# Patient Record
Sex: MALE | URBAN - METROPOLITAN AREA
[De-identification: names, ages, dates, MRNs, and addresses within clinical notes are randomized per-mention and may not be internally consistent; named-entity substitution may affect disease eponyms.]

---

## 2021-02-04 ENCOUNTER — APPOINTMENT (RX ONLY)
Dept: URBAN - METROPOLITAN AREA CLINIC 51 | Facility: CLINIC | Age: 61
Setting detail: DERMATOLOGY
End: 2021-02-04

## 2021-02-04 DIAGNOSIS — L20.89 OTHER ATOPIC DERMATITIS: ICD-10-CM | Status: INADEQUATELY CONTROLLED

## 2021-02-04 DIAGNOSIS — L82.1 OTHER SEBORRHEIC KERATOSIS: ICD-10-CM | Status: STABLE

## 2021-02-04 DIAGNOSIS — L57.3 POIKILODERMA OF CIVATTE: ICD-10-CM | Status: STABLE

## 2021-02-04 PROBLEM — L20.84 INTRINSIC (ALLERGIC) ECZEMA: Status: ACTIVE | Noted: 2021-02-04

## 2021-02-04 PROBLEM — D48.5 NEOPLASM OF UNCERTAIN BEHAVIOR OF SKIN: Status: ACTIVE | Noted: 2021-02-04

## 2021-02-04 PROBLEM — C44.319 BASAL CELL CARCINOMA OF SKIN OF OTHER PARTS OF FACE: Status: ACTIVE | Noted: 2021-02-04

## 2021-02-04 PROCEDURE — 99204 OFFICE O/P NEW MOD 45 MIN: CPT | Mod: 25

## 2021-02-04 PROCEDURE — ? BIOPSY BY SHAVE METHOD

## 2021-02-04 PROCEDURE — ? TREATMENT REGIMEN

## 2021-02-04 PROCEDURE — 17281 DSTR MAL LS F/E/E/N/L/M .6-1: CPT

## 2021-02-04 PROCEDURE — ? COUNSELING

## 2021-02-04 PROCEDURE — ? SHAVE REMOVAL AND DESTRUCTION

## 2021-02-04 PROCEDURE — 11102 TANGNTL BX SKIN SINGLE LES: CPT | Mod: 59

## 2021-02-04 PROCEDURE — ? PRESCRIPTION

## 2021-02-04 RX ORDER — CLOBETASOL PROPIONATE 0.5 MG/G
OINTMENT TOPICAL BID
Qty: 60 | Refills: 1 | Status: ERX

## 2021-02-04 ASSESSMENT — LOCATION DETAILED DESCRIPTION DERM
LOCATION DETAILED: LEFT DISTAL VENTRAL THUMB
LOCATION DETAILED: LEFT DISTAL DORSAL FOREARM
LOCATION DETAILED: LEFT INFERIOR FOREHEAD
LOCATION DETAILED: LEFT SUPERIOR ANTERIOR NECK
LOCATION DETAILED: RIGHT PROXIMAL DORSAL FOREARM
LOCATION DETAILED: RIGHT DISTAL VENTRAL THUMB

## 2021-02-04 ASSESSMENT — LOCATION ZONE DERM
LOCATION ZONE: FACE
LOCATION ZONE: ARM
LOCATION ZONE: NECK
LOCATION ZONE: FINGER

## 2021-02-04 ASSESSMENT — SEVERITY ASSESSMENT 2020: SEVERITY 2020: MILD

## 2021-02-04 ASSESSMENT — LOCATION SIMPLE DESCRIPTION DERM
LOCATION SIMPLE: LEFT ANTERIOR NECK
LOCATION SIMPLE: LEFT FOREHEAD
LOCATION SIMPLE: RIGHT FOREARM
LOCATION SIMPLE: LEFT THUMB
LOCATION SIMPLE: LEFT FOREARM
LOCATION SIMPLE: RIGHT THUMB

## 2021-02-04 ASSESSMENT — BSA RASH: BSA RASH: 2

## 2021-02-04 ASSESSMENT — PAIN INTENSITY VAS: HOW INTENSE IS YOUR PAIN 0 BEING NO PAIN, 10 BEING THE MOST SEVERE PAIN POSSIBLE?: NO PAIN

## 2021-02-04 NOTE — PROCEDURE: SHAVE REMOVAL AND DESTRUCTION
Detail Level: Detailed
Size Of Lesion In Cm: 0.5
Size After Destruction (Required For Destruction Billing): 0.7
Anesthesia Type: 1% lidocaine with epinephrine
Anesthesia Volume In Cc: 1
Anesthesia Volume In Cc: 0
Hemostasis: Electrocautery
Cautery Type: electrodesiccation
Was Curettage Performed?: Yes
Number Of Curettages: 3
Wound Care: Aquaphor
Dressing: Band-Aid
Bill As?: Note: Bill Malignant Destruction If Path Confirms Malignant Lesion. Only Bill As Shave Removal If Path Comes Back Benign. Do Not Bill Shave Removal On Malignant Lesions.: Malignant Destruction
Lab: [id number]
Lab Facility: [id number]
Render Path Notes In Note?: No
Consent: Verbal consent was obtained and risks were reviewed including but not limited to scarring, infection, bleeding, scabbing, incomplete removal, nerve damage and allergy to anesthesia.
Post-Care Instructions: I reviewed with the patient in detail post-care instructions. Patient is to keep the biopsy site dry overnight, and then apply bacitracin twice daily until healed. Patient may apply hydrogen peroxide soaks to remove any crusting.
Notification Instructions: Patient will be notified of biopsy results. However, patient instructed to call the office if not contacted within 2 weeks.
Billing Type: Third-Party Bill

## 2021-02-04 NOTE — HPI: RASH
How Severe Is Your Rash?: mild
Is This A New Presentation, Or A Follow-Up?: Rash
Additional History: Works at a [employer]

## 2021-02-04 NOTE — PROCEDURE: BIOPSY BY SHAVE METHOD
Detail Level: Detailed
Depth Of Biopsy: dermis
Was A Bandage Applied: Yes
Size Of Lesion In Cm: 1
X Size Of Lesion In Cm: 0
Anticipated Plan (Based On Presumed Biopsy Results): Mohs vs. DMS with DST
Biopsy Type: H and E
Biopsy Method: Sheba hay
Anesthesia Type: 1% lidocaine without epinephrine
Hemostasis: Electrocautery
Wound Care: Aquaphor
Dressing: Band-Aid
Destruction After The Procedure: No
Type Of Destruction Used: Curettage
Lab: [id number]
Lab Facility: [id number]
Consent: Verbal consent was obtained and risks were reviewed including but not limited to scarring, infection, bleeding, scabbing, incomplete removal, nerve damage and allergy to anesthesia.
Post-Care Instructions: I reviewed with the patient in detail post-care instructions. Patient is to keep the biopsy site dry overnight, and then apply bacitracin twice daily until healed. Patient may apply hydrogen peroxide soaks to remove any crusting.
Notification Instructions: Patient will be notified of biopsy results. However, patient instructed to call the office if not contacted within 2 weeks.
Billing Type: Third-Party Bill
Information: Selecting Yes will display possible errors in your note based on the variables you have selected. This validation is only offered as a suggestion for you. PLEASE NOTE THAT THE VALIDATION TEXT WILL BE REMOVED WHEN YOU FINALIZE YOUR NOTE. IF YOU WANT TO FAX A PRELIMINARY NOTE YOU WILL NEED TO TOGGLE THIS TO 'NO' IF YOU DO NOT WANT IT IN YOUR FAXED NOTE.

## 2021-02-04 NOTE — PROCEDURE: TREATMENT REGIMEN
Detail Level: Zone
Initiate Treatment: Clobetasol ointment apply to thumbs twice daily for 2 weeks, then may use twice weekly for maintenance. Recommend covering with bandaids for 1 week

## 2021-02-04 NOTE — HPI: SKIN LESION
Is This A New Presentation, Or A Follow-Up?: Skin Lesion
How Severe Is Your Skin Lesion?: mild
Has Your Skin Lesion Been Treated?: not been treated
Additional History: Patient was told it was a deformed tear duct. Place scales and he picks it off, but then it comes back

## 2021-04-14 ENCOUNTER — APPOINTMENT (RX ONLY)
Dept: URBAN - METROPOLITAN AREA CLINIC 51 | Facility: CLINIC | Age: 61
Setting detail: DERMATOLOGY
End: 2021-04-14

## 2021-04-14 DIAGNOSIS — L57.0 ACTINIC KERATOSIS: ICD-10-CM | Status: INADEQUATELY CONTROLLED

## 2021-04-14 PROCEDURE — 17000 DESTRUCT PREMALG LESION: CPT

## 2021-04-14 PROCEDURE — ? COUNSELING

## 2021-04-14 PROCEDURE — ? LIQUID NITROGEN

## 2021-04-14 PROCEDURE — 17003 DESTRUCT PREMALG LES 2-14: CPT

## 2021-04-14 ASSESSMENT — PAIN INTENSITY VAS: HOW INTENSE IS YOUR PAIN 0 BEING NO PAIN, 10 BEING THE MOST SEVERE PAIN POSSIBLE?: NO PAIN

## 2021-04-14 ASSESSMENT — LOCATION SIMPLE DESCRIPTION DERM
LOCATION SIMPLE: LEFT EAR
LOCATION SIMPLE: LEFT ZYGOMA

## 2021-04-14 ASSESSMENT — LOCATION ZONE DERM
LOCATION ZONE: FACE
LOCATION ZONE: EAR

## 2021-04-14 ASSESSMENT — LOCATION DETAILED DESCRIPTION DERM
LOCATION DETAILED: LEFT SUPERIOR CRUS OF ANTIHELIX
LOCATION DETAILED: LEFT LATERAL ZYGOMA

## 2021-04-14 ASSESSMENT — TOTAL NUMBER OF LESIONS: # OF LESIONS?: 2

## 2021-04-14 NOTE — PROCEDURE: LIQUID NITROGEN
Detail Level: Detailed
Post-Care Instructions: I reviewed with the patient in detail post-care instructions. Patient is to wear sunprotection, and avoid picking at any of the treated lesions. Pt may apply Vaseline to crusted or scabbing areas.
Render Note In Bullet Format When Appropriate: No
Consent: The patient's consent was obtained including but not limited to risks of crusting, scabbing, blistering, scarring, darker or lighter pigmentary change, recurrence, incomplete removal and infection.
Render Post-Care Instructions In Note?: yes
Number Of Freeze-Thaw Cycles: 2 freeze-thaw cycles
Duration Of Freeze Thaw-Cycle (Seconds): 2

## 2021-08-09 ENCOUNTER — APPOINTMENT (RX ONLY)
Dept: URBAN - METROPOLITAN AREA CLINIC 51 | Facility: CLINIC | Age: 61
Setting detail: DERMATOLOGY
End: 2021-08-09

## 2021-08-09 DIAGNOSIS — L82.1 OTHER SEBORRHEIC KERATOSIS: ICD-10-CM | Status: STABLE

## 2021-08-09 DIAGNOSIS — Z85.828 PERSONAL HISTORY OF OTHER MALIGNANT NEOPLASM OF SKIN: ICD-10-CM | Status: RESOLVED

## 2021-08-09 PROCEDURE — 99212 OFFICE O/P EST SF 10 MIN: CPT

## 2021-08-09 PROCEDURE — ? COUNSELING

## 2021-08-09 PROCEDURE — ? SUNSCREEN RECOMMENDATIONS

## 2021-08-09 ASSESSMENT — LOCATION DETAILED DESCRIPTION DERM
LOCATION DETAILED: RIGHT DISTAL RADIAL DORSAL FOREARM
LOCATION DETAILED: RIGHT LATERAL MALAR CHEEK
LOCATION DETAILED: LEFT DISTAL DORSAL FOREARM
LOCATION DETAILED: RIGHT SUPERIOR UPPER BACK

## 2021-08-09 ASSESSMENT — LOCATION SIMPLE DESCRIPTION DERM
LOCATION SIMPLE: RIGHT FOREARM
LOCATION SIMPLE: RIGHT CHEEK
LOCATION SIMPLE: LEFT FOREARM
LOCATION SIMPLE: RIGHT UPPER BACK

## 2021-08-09 ASSESSMENT — LOCATION ZONE DERM
LOCATION ZONE: TRUNK
LOCATION ZONE: ARM
LOCATION ZONE: FACE

## 2021-08-09 ASSESSMENT — PAIN INTENSITY VAS: HOW INTENSE IS YOUR PAIN 0 BEING NO PAIN, 10 BEING THE MOST SEVERE PAIN POSSIBLE?: NO PAIN

## 2021-08-09 NOTE — HPI: HISTORY OF BASAL CELL CARCINOMA
What Is The Reason For Today's Visit?: History of Basal Cell Carcinoma
How Many Bccs Have You Had?: one
When Was Your Last Cancer Diagnosed?: 02/21

## 2021-08-17 ENCOUNTER — HISTORICAL (OUTPATIENT)
Dept: RADIOLOGY | Facility: HOSPITAL | Age: 61
End: 2021-08-17

## 2022-04-10 ENCOUNTER — HISTORICAL (OUTPATIENT)
Dept: ADMINISTRATIVE | Facility: HOSPITAL | Age: 62
End: 2022-04-10

## 2022-04-25 VITALS
OXYGEN SATURATION: 98 % | SYSTOLIC BLOOD PRESSURE: 117 MMHG | DIASTOLIC BLOOD PRESSURE: 69 MMHG | BODY MASS INDEX: 30.29 KG/M2 | HEIGHT: 60 IN | WEIGHT: 154.31 LBS

## 2022-07-26 RX ORDER — LISINOPRIL 20 MG/1
20 TABLET ORAL 2 TIMES DAILY
COMMUNITY
Start: 2021-08-10 | End: 2022-09-19 | Stop reason: SDUPTHER

## 2022-07-26 RX ORDER — CLOPIDOGREL BISULFATE 75 MG/1
75 TABLET ORAL DAILY
COMMUNITY
Start: 2021-08-10 | End: 2022-09-19 | Stop reason: SDUPTHER

## 2022-07-26 RX ORDER — NAPROXEN SODIUM 220 MG/1
81 TABLET, FILM COATED ORAL DAILY
COMMUNITY
Start: 2021-08-18 | End: 2022-09-19 | Stop reason: SDUPTHER

## 2022-07-26 RX ORDER — AMLODIPINE BESYLATE 10 MG/1
10 TABLET ORAL DAILY
COMMUNITY
Start: 2021-08-10 | End: 2022-09-19 | Stop reason: SDUPTHER

## 2022-07-26 RX ORDER — ATORVASTATIN CALCIUM 80 MG/1
80 TABLET, FILM COATED ORAL DAILY
COMMUNITY
Start: 2021-08-18 | End: 2022-09-19 | Stop reason: SDUPTHER

## 2022-07-27 ENCOUNTER — TELEPHONE (OUTPATIENT)
Dept: CARDIOLOGY | Facility: CLINIC | Age: 62
End: 2022-07-27
Payer: MEDICAID

## 2022-07-27 DIAGNOSIS — I42.0 CONGESTIVE CARDIOMYOPATHY: Primary | ICD-10-CM

## 2022-07-27 NOTE — TELEPHONE ENCOUNTER
----- Message from Twan Castle MD sent at 7/26/2022  6:02 PM CDT -----  This patient has requested refills on medication(s).  The patient being on medications that affect:  Kidney function    The patient needs the following labs checked:  BMP

## 2022-08-31 ENCOUNTER — APPOINTMENT (RX ONLY)
Dept: URBAN - METROPOLITAN AREA CLINIC 51 | Facility: CLINIC | Age: 62
Setting detail: DERMATOLOGY
End: 2022-08-31

## 2022-08-31 DIAGNOSIS — Z85.828 PERSONAL HISTORY OF OTHER MALIGNANT NEOPLASM OF SKIN: ICD-10-CM | Status: RESOLVED

## 2022-08-31 DIAGNOSIS — L82.1 OTHER SEBORRHEIC KERATOSIS: ICD-10-CM | Status: STABLE

## 2022-08-31 DIAGNOSIS — L81.4 OTHER MELANIN HYPERPIGMENTATION: ICD-10-CM | Status: STABLE

## 2022-08-31 DIAGNOSIS — L57.0 ACTINIC KERATOSIS: ICD-10-CM

## 2022-08-31 PROCEDURE — 17000 DESTRUCT PREMALG LESION: CPT | Mod: 59

## 2022-08-31 PROCEDURE — 17003 DESTRUCT PREMALG LES 2-14: CPT | Mod: 59

## 2022-08-31 PROCEDURE — 99212 OFFICE O/P EST SF 10 MIN: CPT | Mod: 25

## 2022-08-31 PROCEDURE — ? COUNSELING

## 2022-08-31 PROCEDURE — ? LIQUID NITROGEN

## 2022-08-31 PROCEDURE — ? SUNSCREEN RECOMMENDATIONS

## 2022-08-31 PROCEDURE — 17110 DESTRUCTION B9 LES UP TO 14: CPT

## 2022-08-31 ASSESSMENT — LOCATION DETAILED DESCRIPTION DERM
LOCATION DETAILED: RIGHT SUPERIOR MEDIAL MALAR CHEEK
LOCATION DETAILED: RIGHT LATERAL UPPER BACK
LOCATION DETAILED: LEFT LATERAL EYEBROW
LOCATION DETAILED: LEFT DISTAL POSTERIOR UPPER ARM
LOCATION DETAILED: LEFT SUPERIOR CRUS OF ANTIHELIX
LOCATION DETAILED: RIGHT SUPERIOR HELIX

## 2022-08-31 ASSESSMENT — TOTAL NUMBER OF LESIONS: # OF LESIONS?: 2

## 2022-08-31 ASSESSMENT — LOCATION SIMPLE DESCRIPTION DERM
LOCATION SIMPLE: LEFT EAR
LOCATION SIMPLE: RIGHT CHEEK
LOCATION SIMPLE: RIGHT EAR
LOCATION SIMPLE: LEFT UPPER ARM
LOCATION SIMPLE: LEFT EYEBROW
LOCATION SIMPLE: RIGHT UPPER BACK

## 2022-08-31 ASSESSMENT — LOCATION ZONE DERM
LOCATION ZONE: ARM
LOCATION ZONE: TRUNK
LOCATION ZONE: EAR
LOCATION ZONE: FACE

## 2022-08-31 ASSESSMENT — PAIN INTENSITY VAS: HOW INTENSE IS YOUR PAIN 0 BEING NO PAIN, 10 BEING THE MOST SEVERE PAIN POSSIBLE?: NO PAIN

## 2022-08-31 NOTE — PROCEDURE: LIQUID NITROGEN
Consent: The patient's consent was obtained including but not limited to risks of crusting, scabbing, blistering, scarring, darker or lighter pigmentary change, recurrence, incomplete removal and infection.
Show Aperture Variable?: Yes
Render Note In Bullet Format When Appropriate: No
Duration Of Freeze Thaw-Cycle (Seconds): 2
Post-Care Instructions: I reviewed with the patient in detail post-care instructions. Patient is to wear sunprotection, and avoid picking at any of the treated lesions. Pt may apply Vaseline to crusted or scabbing areas.
Detail Level: Detailed
Number Of Freeze-Thaw Cycles: 2 freeze-thaw cycles
Spray Paint Text: The liquid nitrogen was applied to the skin utilizing a spray paint frosting technique.
Medical Necessity Information: It is in your best interest to select a reason for this procedure from the list below. All of these items fulfill various CMS LCD requirements except the new and changing color options.
Medical Necessity Clause: This procedure was medically necessary because the lesions that were treated were:

## 2022-08-31 NOTE — HPI: NON-MELANOMA SKIN CANCER F/U (HISTORY OF NMSC)
What Is The Reason For Today's Visit?: History of Non-Melanoma Skin Cancer
How Many Skin Cancers Have You Had?: one
When Was Your Last Cancer Diagnosed?: February 2021

## 2022-09-01 RX ORDER — DICLOFENAC SODIUM 10 MG/G
GEL TOPICAL
COMMUNITY
Start: 2021-12-09

## 2022-09-01 RX ORDER — GABAPENTIN 300 MG/1
300 CAPSULE ORAL
COMMUNITY
Start: 2022-01-18 | End: 2022-09-19

## 2022-09-01 RX ORDER — PANTOPRAZOLE SODIUM 40 MG/1
40 TABLET, DELAYED RELEASE ORAL
COMMUNITY
Start: 2022-01-06 | End: 2022-09-19 | Stop reason: SDUPTHER

## 2022-09-06 DIAGNOSIS — E78.5 HYPERLIPIDEMIA, UNSPECIFIED HYPERLIPIDEMIA TYPE: ICD-10-CM

## 2022-09-06 DIAGNOSIS — I10 HYPERTENSION, UNSPECIFIED TYPE: ICD-10-CM

## 2022-09-06 DIAGNOSIS — Z00.00 WELLNESS EXAMINATION: Primary | ICD-10-CM

## 2022-09-19 ENCOUNTER — OFFICE VISIT (OUTPATIENT)
Dept: CARDIOLOGY | Facility: CLINIC | Age: 62
End: 2022-09-19
Payer: MEDICAID

## 2022-09-19 VITALS
WEIGHT: 148.13 LBS | BODY MASS INDEX: 25.29 KG/M2 | TEMPERATURE: 98 F | HEART RATE: 63 BPM | OXYGEN SATURATION: 99 % | SYSTOLIC BLOOD PRESSURE: 138 MMHG | RESPIRATION RATE: 18 BRPM | DIASTOLIC BLOOD PRESSURE: 82 MMHG | HEIGHT: 64 IN

## 2022-09-19 DIAGNOSIS — Z72.0 TOBACCO USE: ICD-10-CM

## 2022-09-19 DIAGNOSIS — E78.5 HYPERLIPIDEMIA LDL GOAL <70: ICD-10-CM

## 2022-09-19 DIAGNOSIS — I10 PRIMARY HYPERTENSION: Primary | ICD-10-CM

## 2022-09-19 DIAGNOSIS — Z86.73 HISTORY OF STROKE: ICD-10-CM

## 2022-09-19 LAB
ALBUMIN SERPL-MCNC: 4.1 GM/DL (ref 3.4–4.8)
ALBUMIN/GLOB SERPL: 1.1 RATIO (ref 1.1–2)
ALP SERPL-CCNC: 109 UNIT/L (ref 40–150)
ALT SERPL-CCNC: 24 UNIT/L (ref 0–55)
AST SERPL-CCNC: 19 UNIT/L (ref 5–34)
BILIRUBIN DIRECT+TOT PNL SERPL-MCNC: 0.3 MG/DL
BUN SERPL-MCNC: 22.9 MG/DL (ref 8.4–25.7)
CALCIUM SERPL-MCNC: 9.8 MG/DL (ref 8.8–10)
CHLORIDE SERPL-SCNC: 103 MMOL/L (ref 98–107)
CHOLEST SERPL-MCNC: 151 MG/DL
CHOLEST/HDLC SERPL: 3 {RATIO} (ref 0–5)
CO2 SERPL-SCNC: 26 MMOL/L (ref 23–31)
CREAT SERPL-MCNC: 0.78 MG/DL (ref 0.73–1.18)
GFR SERPLBLD CREATININE-BSD FMLA CKD-EPI: >60 MLS/MIN/1.73/M2
GLOBULIN SER-MCNC: 3.9 GM/DL (ref 2.4–3.5)
GLUCOSE SERPL-MCNC: 110 MG/DL (ref 82–115)
HDLC SERPL-MCNC: 47 MG/DL (ref 35–60)
LDLC SERPL CALC-MCNC: 75 MG/DL (ref 50–140)
POTASSIUM SERPL-SCNC: 3.9 MMOL/L (ref 3.5–5.1)
PROT SERPL-MCNC: 8 GM/DL (ref 5.8–7.6)
SODIUM SERPL-SCNC: 139 MMOL/L (ref 136–145)
TRIGL SERPL-MCNC: 144 MG/DL (ref 34–140)
VLDLC SERPL CALC-MCNC: 29 MG/DL

## 2022-09-19 PROCEDURE — 3079F DIAST BP 80-89 MM HG: CPT | Mod: CPTII,,, | Performed by: NURSE PRACTITIONER

## 2022-09-19 PROCEDURE — 4010F PR ACE/ARB THEARPY RXD/TAKEN: ICD-10-PCS | Mod: CPTII,,, | Performed by: NURSE PRACTITIONER

## 2022-09-19 PROCEDURE — 1160F PR REVIEW ALL MEDS BY PRESCRIBER/CLIN PHARMACIST DOCUMENTED: ICD-10-PCS | Mod: CPTII,,, | Performed by: NURSE PRACTITIONER

## 2022-09-19 PROCEDURE — 99214 OFFICE O/P EST MOD 30 MIN: CPT | Mod: PBBFAC | Performed by: NURSE PRACTITIONER

## 2022-09-19 PROCEDURE — 80053 COMPREHEN METABOLIC PANEL: CPT | Performed by: NURSE PRACTITIONER

## 2022-09-19 PROCEDURE — 93005 ELECTROCARDIOGRAM TRACING: CPT

## 2022-09-19 PROCEDURE — 99214 PR OFFICE/OUTPT VISIT, EST, LEVL IV, 30-39 MIN: ICD-10-PCS | Mod: S$PBB,,, | Performed by: NURSE PRACTITIONER

## 2022-09-19 PROCEDURE — 3008F PR BODY MASS INDEX (BMI) DOCUMENTED: ICD-10-PCS | Mod: CPTII,,, | Performed by: NURSE PRACTITIONER

## 2022-09-19 PROCEDURE — 4010F ACE/ARB THERAPY RXD/TAKEN: CPT | Mod: CPTII,,, | Performed by: NURSE PRACTITIONER

## 2022-09-19 PROCEDURE — 1159F MED LIST DOCD IN RCRD: CPT | Mod: CPTII,,, | Performed by: NURSE PRACTITIONER

## 2022-09-19 PROCEDURE — 1160F RVW MEDS BY RX/DR IN RCRD: CPT | Mod: CPTII,,, | Performed by: NURSE PRACTITIONER

## 2022-09-19 PROCEDURE — 36415 COLL VENOUS BLD VENIPUNCTURE: CPT | Performed by: NURSE PRACTITIONER

## 2022-09-19 PROCEDURE — 3075F PR MOST RECENT SYSTOLIC BLOOD PRESS GE 130-139MM HG: ICD-10-PCS | Mod: CPTII,,, | Performed by: NURSE PRACTITIONER

## 2022-09-19 PROCEDURE — 3008F BODY MASS INDEX DOCD: CPT | Mod: CPTII,,, | Performed by: NURSE PRACTITIONER

## 2022-09-19 PROCEDURE — 99214 OFFICE O/P EST MOD 30 MIN: CPT | Mod: S$PBB,,, | Performed by: NURSE PRACTITIONER

## 2022-09-19 PROCEDURE — 3075F SYST BP GE 130 - 139MM HG: CPT | Mod: CPTII,,, | Performed by: NURSE PRACTITIONER

## 2022-09-19 PROCEDURE — 3079F PR MOST RECENT DIASTOLIC BLOOD PRESSURE 80-89 MM HG: ICD-10-PCS | Mod: CPTII,,, | Performed by: NURSE PRACTITIONER

## 2022-09-19 PROCEDURE — 80061 LIPID PANEL: CPT | Performed by: NURSE PRACTITIONER

## 2022-09-19 PROCEDURE — 1159F PR MEDICATION LIST DOCUMENTED IN MEDICAL RECORD: ICD-10-PCS | Mod: CPTII,,, | Performed by: NURSE PRACTITIONER

## 2022-09-19 RX ORDER — NAPROXEN SODIUM 220 MG/1
81 TABLET, FILM COATED ORAL DAILY
Qty: 90 TABLET | Refills: 3 | Status: SHIPPED | OUTPATIENT
Start: 2022-09-19 | End: 2023-08-14 | Stop reason: SDUPTHER

## 2022-09-19 RX ORDER — HYDROCHLOROTHIAZIDE 12.5 MG/1
12.5 CAPSULE ORAL DAILY
COMMUNITY
Start: 2022-08-28 | End: 2022-09-19 | Stop reason: SDUPTHER

## 2022-09-19 RX ORDER — AMLODIPINE BESYLATE 10 MG/1
10 TABLET ORAL DAILY
Qty: 90 TABLET | Refills: 3 | Status: SHIPPED | OUTPATIENT
Start: 2022-09-19 | End: 2023-08-14 | Stop reason: SDUPTHER

## 2022-09-19 RX ORDER — ATORVASTATIN CALCIUM 80 MG/1
80 TABLET, FILM COATED ORAL DAILY
Qty: 90 TABLET | Refills: 3 | Status: SHIPPED | OUTPATIENT
Start: 2022-09-19 | End: 2023-08-14 | Stop reason: SDUPTHER

## 2022-09-19 RX ORDER — HYDROCHLOROTHIAZIDE 12.5 MG/1
12.5 CAPSULE ORAL DAILY
Qty: 90 CAPSULE | Refills: 3 | Status: SHIPPED | OUTPATIENT
Start: 2022-09-19 | End: 2023-08-14 | Stop reason: SDUPTHER

## 2022-09-19 RX ORDER — PANTOPRAZOLE SODIUM 40 MG/1
40 TABLET, DELAYED RELEASE ORAL DAILY
Qty: 90 TABLET | Refills: 0 | Status: SHIPPED | OUTPATIENT
Start: 2022-09-19 | End: 2022-12-18

## 2022-09-19 RX ORDER — LISINOPRIL 40 MG/1
40 TABLET ORAL DAILY
Qty: 90 TABLET | Refills: 3 | Status: SHIPPED | OUTPATIENT
Start: 2022-09-19 | End: 2023-08-14 | Stop reason: SDUPTHER

## 2022-09-19 RX ORDER — CLOPIDOGREL BISULFATE 75 MG/1
75 TABLET ORAL DAILY
Qty: 90 TABLET | Refills: 3 | Status: SHIPPED | OUTPATIENT
Start: 2022-09-19 | End: 2023-08-14 | Stop reason: SDUPTHER

## 2022-09-19 NOTE — PATIENT INSTRUCTIONS
EKG today  CMP, FLP today and in 6 months  Carotid US  Follow up in Cardiology Clinic in 6 months on a Wednesday afternoon per patient's request  Refer to internal medicine clinic to establish PCP

## 2022-09-19 NOTE — PROGRESS NOTES
CHIEF COMPLAINT:   Chief Complaint   Patient presents with    Follow-up     Pt denies all targets                   Review of patient's allergies indicates:  No Known Allergies                                       HPI:  Dave Holman Sr. 62 y.o. male with past medical history of CVA (R striatocapsular and R thalamic lacunar infarcts), hypertension, hyperlipidemia, and tobacco use presents for 6 month follow-up on ongoing care.  Patient has no cardiac complaints at this time.  He denies chest pain, shortness of breath, palpitations, orthopnea, PND, peripheral edema, or syncope.  Patient completed an echocardiogram in July 2021 which revealed an ejection fraction approximately 60%, diastolic dysfunction, positive bubble study with Valsalva.  He reports significant improvement in his left-sided weakness following his stroke stating he completed physical therapy.  He continues to smoke approximately a pack cigarettes per day, but is not interested in quitting at this time.  He also states he has been out of his lisinopril and is requesting a refill today.                                                                                                                                                                                                                                                                    Patient Active Problem List   Diagnosis    Primary hypertension    Hyperlipidemia LDL goal <70    History of stroke    Tobacco use       History reviewed. No pertinent surgical history.  Social History     Socioeconomic History    Marital status: Single   Tobacco Use    Smoking status: Never     Passive exposure: Never    Smokeless tobacco: Never   Substance and Sexual Activity    Alcohol use: Never    Drug use: Never        Family History   Problem Relation Age of Onset    Cancer Mother     Stroke Brother          Current Outpatient Medications:     diclofenac sodium (VOLTAREN) 1 % Gel, Apply topically., Disp: ,  "Rfl:     amLODIPine (NORVASC) 10 MG tablet, Take 1 tablet (10 mg total) by mouth Daily., Disp: 90 tablet, Rfl: 3    aspirin 81 MG Chew, Take 1 tablet (81 mg total) by mouth once daily., Disp: 90 tablet, Rfl: 3    atorvastatin (LIPITOR) 80 MG tablet, Take 1 tablet (80 mg total) by mouth Daily., Disp: 90 tablet, Rfl: 3    clopidogreL (PLAVIX) 75 mg tablet, Take 1 tablet (75 mg total) by mouth once daily., Disp: 90 tablet, Rfl: 3    hydroCHLOROthiazide (MICROZIDE) 12.5 mg capsule, Take 1 capsule (12.5 mg total) by mouth once daily., Disp: 90 capsule, Rfl: 3    lisinopriL (PRINIVIL,ZESTRIL) 40 MG tablet, Take 1 tablet (40 mg total) by mouth once daily., Disp: 90 tablet, Rfl: 3    pantoprazole (PROTONIX) 40 MG tablet, Take 1 tablet (40 mg total) by mouth once daily., Disp: 90 tablet, Rfl: 0     ROS:                                                                                                                                                                             Review of Systems   Constitutional: Negative.    Respiratory: Negative.     Cardiovascular: Negative.    Gastrointestinal: Negative.    Musculoskeletal: Negative.    Skin: Negative.    Neurological: Negative.    Psychiatric/Behavioral: Negative.        Blood pressure 138/82, pulse 63, temperature 97.9 °F (36.6 °C), resp. rate 18, height 5' 3.98" (1.625 m), weight 67.2 kg (148 lb 2.4 oz), SpO2 99 %.   PE:  Physical Exam  Constitutional:       Appearance: Normal appearance.   HENT:      Head: Normocephalic.   Eyes:      Extraocular Movements: Extraocular movements intact.   Cardiovascular:      Rate and Rhythm: Normal rate and regular rhythm.   Pulmonary:      Effort: Pulmonary effort is normal.   Abdominal:      Palpations: Abdomen is soft.   Musculoskeletal:         General: Normal range of motion.      Cervical back: Neck supple.   Skin:     General: Skin is warm and dry.   Neurological:      General: No focal deficit present.      Mental Status: He is " alert and oriented to person, place, and time.   Psychiatric:         Mood and Affect: Mood normal.        ASSESSMENT/PLAN:  Hx of CVA   Carotid US Aug. 2021 revealed right internal carotid artery 50 to 69% stenosis, left internal carotid artery less than 50% stenosis.  Will repeat Carotid US  Continue Aspirin, Plavix, and Atorvastatin      Hypertension  BP at goal - 138/82  Continue Amlodipine 10 mg daily, Lisinopril 40 mg p.o. daily, HCTZ 2.5 mg p.o. daily   Labs today  Counseled on low salt diet and exercise as tolerated    HLD  Continue Atorvastatin 80mg daily   Will get CMP, FLP today and again in 6 months    Tobacco Use  Currently smokes a pack of cigarettes per day, not ready to quit  Strongly encouraged smoking cessation         EKG today  CMP, FLP today and in 6 months  Carotid US  Follow up in Cardiology Clinic in 6 months on a Wednesday afternoon per patient's request  Refer to internal medicine clinic to establish PCP

## 2022-09-28 ENCOUNTER — OFFICE VISIT (OUTPATIENT)
Dept: INTERNAL MEDICINE | Facility: CLINIC | Age: 62
End: 2022-09-28
Payer: MEDICAID

## 2022-09-28 VITALS
TEMPERATURE: 98 F | BODY MASS INDEX: 26.17 KG/M2 | OXYGEN SATURATION: 95 % | SYSTOLIC BLOOD PRESSURE: 107 MMHG | HEIGHT: 63 IN | RESPIRATION RATE: 16 BRPM | WEIGHT: 147.69 LBS | DIASTOLIC BLOOD PRESSURE: 75 MMHG | HEART RATE: 89 BPM

## 2022-09-28 DIAGNOSIS — I10 PRIMARY HYPERTENSION: ICD-10-CM

## 2022-09-28 DIAGNOSIS — K21.9 GASTROESOPHAGEAL REFLUX DISEASE, UNSPECIFIED WHETHER ESOPHAGITIS PRESENT: ICD-10-CM

## 2022-09-28 DIAGNOSIS — Z86.73 HISTORY OF STROKE: Primary | ICD-10-CM

## 2022-09-28 DIAGNOSIS — Z72.0 TOBACCO USE: ICD-10-CM

## 2022-09-28 DIAGNOSIS — E78.5 HYPERLIPIDEMIA LDL GOAL <70: ICD-10-CM

## 2022-09-28 PROCEDURE — 99214 OFFICE O/P EST MOD 30 MIN: CPT | Mod: PBBFAC

## 2022-09-28 NOTE — PROGRESS NOTES
Subjective:       Patient ID: Dave Holman Sr. is a 62 y.o. male.    Chief Complaint: Follow-up    Patient is a 62-year-old  male with past medical history of CVA with residual deficits, hypertension, hyperlipidemia, GERD who presents to  Clinic for initial visit with myself.  Patient was seen by Cardiology last week and referred at that time.  Patient had a stroke over a year ago, overall has greatly improved since then.  Still walks with a cane and has some issues with expressive aphasia, he states he lives at home with his girlfriend and is very independent, does not have issues with activities of daily living.  Patient is compliant with all of his medications, he is not quite aware of everything he takes but states he has family that helps him organize meds.  Patient overall feeling well and has no acute complaints today  Review of Systems   Constitutional:  Negative for activity change, chills and fever.   HENT:  Positive for dental problem.    Respiratory:  Negative for chest tightness and shortness of breath.    Cardiovascular:  Negative for chest pain and leg swelling.   Gastrointestinal:  Negative for abdominal pain.   Genitourinary:  Negative for dysuria.   Musculoskeletal:  Positive for arthralgias.   Neurological:  Positive for weakness. Negative for dizziness.       Objective:      Physical Exam  Constitutional:       Appearance: Normal appearance.      Comments: Using cane   HENT:      Head: Normocephalic and atraumatic.   Cardiovascular:      Rate and Rhythm: Normal rate and regular rhythm.      Pulses: Normal pulses.      Heart sounds: Normal heart sounds.   Pulmonary:      Effort: Pulmonary effort is normal.      Breath sounds: Wheezing (faint expiratoy at bases) present.   Abdominal:      General: Abdomen is flat.      Palpations: Abdomen is soft.   Skin:     Capillary Refill: Capillary refill takes less than 2 seconds.   Neurological:      General: No focal deficit present.      Mental  Status: He is alert and oriented to person, place, and time.       Assessment:       Problem List Items Addressed This Visit          Neuro    History of stroke - Primary       Cardiac/Vascular    Primary hypertension    Hyperlipidemia LDL goal <70       GI    GERD (gastroesophageal reflux disease)       Other    Tobacco use         Plan:       Hx of CVA   Positive bubble study on echo  Carotid US Aug. 2021 revealed right internal carotid artery 50 to 69% stenosis, repeat has been ordered  Patient with mild deficits remaining on left side, much improved from last year  Continue Aspirin, Plavix, and Atorvastatin   Continue to follow with Cardiology as scheduled     Hypertension  Continue Amlodipine 10 mg daily, Lisinopril 40 mg p.o. daily, HCTZ 2.5 mg p.o. daily   Patient does not check blood pressure at home, good range in office today     HLD  Continue Atorvastatin 80mg daily     Tobacco Use  Currently smokes a pack of cigarettes per day  Encouraged and counseled on smoking cessation   Ordering low-dose screening CT to be done    GERD   -symptoms stable, continue Protonix 40 mg daily    Patient did not want pneumococcal vaccine, shingles shot, or flu shot at this time   Will place order for Cologuard  Low-dose screening CT ordered  Other health maintenance to be addressed at next visit    Damon Grimes DO  [facility] PGY2

## 2023-08-08 ENCOUNTER — HOSPITAL ENCOUNTER (OUTPATIENT)
Dept: CARDIOLOGY | Facility: HOSPITAL | Age: 63
Discharge: HOME | End: 2023-08-08
Attending: NURSE PRACTITIONER
Payer: MEDICAID

## 2023-08-08 DIAGNOSIS — Z86.73 HISTORY OF STROKE: ICD-10-CM

## 2023-08-08 PROCEDURE — 93880 EXTRACRANIAL BILAT STUDY: CPT

## 2023-08-11 LAB
LEFT CCA DIST DIAS: 0 CM/S
LEFT CCA DIST SYS: 120 CM/S
LEFT CCA PROX DIAS: 13 CM/S
LEFT CCA PROX SYS: 111 CM/S
LEFT ECA DIAS: 19 CM/S
LEFT ECA SYS: 173 CM/S
LEFT ICA DIST DIAS: 15 CM/S
LEFT ICA DIST SYS: 82 CM/S
LEFT ICA MID DIAS: 7 CM/S
LEFT ICA MID SYS: 132 CM/S
LEFT ICA PROX DIAS: 17 CM/S
LEFT ICA PROX SYS: 142 CM/S
LEFT VERTEBRAL DIAS: 23 CM/S
LEFT VERTEBRAL SYS: 88 CM/S
OHS CV CAROTID RIGHT ICA EDV HIGHEST: 36
OHS CV CAROTID ULTRASOUND LEFT ICA/CCA RATIO: 1.18
OHS CV CAROTID ULTRASOUND RIGHT ICA/CCA RATIO: 2.1
OHS CV PV CAROTID LEFT HIGHEST CCA: 120
OHS CV PV CAROTID LEFT HIGHEST ICA: 142
OHS CV PV CAROTID RIGHT HIGHEST CCA: 92
OHS CV PV CAROTID RIGHT HIGHEST ICA: 193
OHS CV US CAROTID LEFT HIGHEST EDV: 17
RIGHT CCA DIST DIAS: 14 CM/S
RIGHT CCA DIST SYS: 92 CM/S
RIGHT CCA PROX DIAS: 14 CM/S
RIGHT CCA PROX SYS: 86 CM/S
RIGHT ECA DIAS: 21 CM/S
RIGHT ECA SYS: 215 CM/S
RIGHT ICA DIST DIAS: 23 CM/S
RIGHT ICA DIST SYS: 120 CM/S
RIGHT ICA MID DIAS: 36 CM/S
RIGHT ICA MID SYS: 193 CM/S
RIGHT ICA PROX DIAS: 15 CM/S
RIGHT ICA PROX SYS: 132 CM/S
RIGHT VERTEBRAL DIAS: 0 CM/S
RIGHT VERTEBRAL SYS: 69 CM/S

## 2023-08-14 ENCOUNTER — OFFICE VISIT (OUTPATIENT)
Dept: CARDIOLOGY | Facility: CLINIC | Age: 63
End: 2023-08-14
Payer: MEDICAID

## 2023-08-14 VITALS
SYSTOLIC BLOOD PRESSURE: 156 MMHG | BODY MASS INDEX: 26.26 KG/M2 | WEIGHT: 148.19 LBS | RESPIRATION RATE: 18 BRPM | TEMPERATURE: 98 F | DIASTOLIC BLOOD PRESSURE: 78 MMHG | HEART RATE: 90 BPM | HEIGHT: 63 IN | OXYGEN SATURATION: 99 %

## 2023-08-14 DIAGNOSIS — Z86.73 HISTORY OF STROKE: ICD-10-CM

## 2023-08-14 DIAGNOSIS — I10 PRIMARY HYPERTENSION: ICD-10-CM

## 2023-08-14 DIAGNOSIS — E78.5 HYPERLIPIDEMIA LDL GOAL <70: ICD-10-CM

## 2023-08-14 DIAGNOSIS — I65.21 CAROTID ARTERY STENOSIS, UNILATERAL, RIGHT: Primary | ICD-10-CM

## 2023-08-14 PROCEDURE — 99215 OFFICE O/P EST HI 40 MIN: CPT | Mod: PBBFAC

## 2023-08-14 RX ORDER — AMLODIPINE BESYLATE 10 MG/1
10 TABLET ORAL DAILY
Qty: 90 TABLET | Refills: 3 | Status: SHIPPED | OUTPATIENT
Start: 2023-08-14 | End: 2023-08-14

## 2023-08-14 RX ORDER — AMLODIPINE BESYLATE 10 MG/1
10 TABLET ORAL DAILY
Qty: 90 TABLET | Refills: 3 | Status: SHIPPED | OUTPATIENT
Start: 2023-08-14 | End: 2024-08-13

## 2023-08-14 RX ORDER — CLOPIDOGREL BISULFATE 75 MG/1
75 TABLET ORAL DAILY
Qty: 90 TABLET | Refills: 3 | Status: SHIPPED | OUTPATIENT
Start: 2023-08-14 | End: 2023-08-14

## 2023-08-14 RX ORDER — ATORVASTATIN CALCIUM 80 MG/1
80 TABLET, FILM COATED ORAL DAILY
Qty: 90 TABLET | Refills: 3 | Status: SHIPPED | OUTPATIENT
Start: 2023-08-14 | End: 2024-08-13

## 2023-08-14 RX ORDER — HYDROCHLOROTHIAZIDE 12.5 MG/1
12.5 CAPSULE ORAL DAILY
Qty: 90 CAPSULE | Refills: 3 | Status: SHIPPED | OUTPATIENT
Start: 2023-08-14 | End: 2023-08-14

## 2023-08-14 RX ORDER — HYDROCHLOROTHIAZIDE 12.5 MG/1
12.5 CAPSULE ORAL DAILY
Qty: 90 CAPSULE | Refills: 3 | Status: SHIPPED | OUTPATIENT
Start: 2023-08-14 | End: 2024-08-13

## 2023-08-14 RX ORDER — NAPROXEN SODIUM 220 MG/1
81 TABLET, FILM COATED ORAL DAILY
Qty: 90 TABLET | Refills: 3 | Status: SHIPPED | OUTPATIENT
Start: 2023-08-14 | End: 2023-08-14

## 2023-08-14 RX ORDER — CLOPIDOGREL BISULFATE 75 MG/1
75 TABLET ORAL DAILY
Qty: 90 TABLET | Refills: 3 | Status: SHIPPED | OUTPATIENT
Start: 2023-08-14 | End: 2024-08-13

## 2023-08-14 RX ORDER — LISINOPRIL 40 MG/1
40 TABLET ORAL DAILY
Qty: 90 TABLET | Refills: 3 | Status: SHIPPED | OUTPATIENT
Start: 2023-08-14 | End: 2024-08-13

## 2023-08-14 RX ORDER — ATORVASTATIN CALCIUM 80 MG/1
80 TABLET, FILM COATED ORAL DAILY
Qty: 90 TABLET | Refills: 3 | Status: SHIPPED | OUTPATIENT
Start: 2023-08-14 | End: 2023-08-14

## 2023-08-14 RX ORDER — NAPROXEN SODIUM 220 MG/1
81 TABLET, FILM COATED ORAL DAILY
Qty: 90 TABLET | Refills: 3 | Status: SHIPPED | OUTPATIENT
Start: 2023-08-14 | End: 2024-08-13

## 2023-08-14 RX ORDER — LISINOPRIL 40 MG/1
40 TABLET ORAL DAILY
Qty: 90 TABLET | Refills: 3 | Status: SHIPPED | OUTPATIENT
Start: 2023-08-14 | End: 2023-08-14

## 2023-08-14 NOTE — PROGRESS NOTES
CHIEF COMPLAINT:   Chief Complaint   Patient presents with    Follow-up     Carotid us and labs                   Review of patient's allergies indicates:  No Known Allergies                                       HPI:  Dave Holman Sr. 62 y.o. male with past medical history of CVA (R striatocapsular and R thalamic lacunar infarcts), hypertension, hyperlipidemia, and tobacco use presents for follow-up, ongoing care, and review of testing.  At last appointment patient denied any cardiac complaints.  Patient completed an echocardiogram in July 2021 which revealed an ejection fraction approximately 60%, diastolic dysfunction, positive bubble study with Valsalva.  Patient with history of right-sided stroke with residual left-sided weakness for which she completed physical therapy for.  Updated carotid ultrasound was ordered at last office visit.  Carotid ultrasounds revealed 50-69% stenosis of the right ICA and less than 50% stenosis the left ICA (see full report below).     Today the patient denies any cardiac complaints.  He denies any chest pain, shortness a breath, palpitations, lightheadedness, dizziness, syncope, PND, or claudication same symptoms.  He states that he has been feeling pretty well overall.  He reports that he is able to complete his ADLs without any issues or ischemic symptoms.  He is fairly active at home but uses walking cane to help him get around.  He reports that he rides his bike for exercise.  He reports compliance with all medications, however he states that he has been out of his meds for several days now.  He continues to smoke half pack per day and is not interested in quitting at this time.                                                      CARDIAC TESTING  CV Carotid US Bilateral 8.8.23  The right internal carotid artery demonstrated 50-69% stenosis.   The left internal carotid artery demonstrated less than 50% stenosis.  Bilateral vertebral arteries were patent with antegrade  flow.    Patient Active Problem List   Diagnosis    Primary hypertension    Hyperlipidemia LDL goal <70    History of stroke    Tobacco use    GERD (gastroesophageal reflux disease)       History reviewed. No pertinent surgical history.  Social History     Socioeconomic History    Marital status:    Occupational History    Occupation: dissabled   Tobacco Use    Smoking status: Every Day     Current packs/day: 0.50     Average packs/day: 1.4 packs/day for 83.0 years (120.0 ttl pk-yrs)     Types: Cigarettes     Start date: 1/1/1972     Passive exposure: Never    Smokeless tobacco: Never   Substance and Sexual Activity    Alcohol use: Yes     Alcohol/week: 6.0 standard drinks of alcohol     Types: 6 Cans of beer per week    Drug use: Never    Sexual activity: Yes     Partners: Female     Birth control/protection: None        Family History   Problem Relation Age of Onset    Cancer Mother     Diabetes Mother     Heart disease Father     Stroke Brother     Cancer Brother     Heart disease Brother     Hypertension Brother     Diabetes Sister     Hypertension Sister          Current Outpatient Medications:     amLODIPine (NORVASC) 10 MG tablet, Take 1 tablet (10 mg total) by mouth Daily., Disp: 90 tablet, Rfl: 3    aspirin 81 MG Chew, Take 1 tablet (81 mg total) by mouth once daily., Disp: 90 tablet, Rfl: 3    atorvastatin (LIPITOR) 80 MG tablet, Take 1 tablet (80 mg total) by mouth Daily., Disp: 90 tablet, Rfl: 3    clopidogreL (PLAVIX) 75 mg tablet, Take 1 tablet (75 mg total) by mouth once daily., Disp: 90 tablet, Rfl: 3    diclofenac sodium (VOLTAREN) 1 % Gel, Apply topically., Disp: , Rfl:     hydroCHLOROthiazide (MICROZIDE) 12.5 mg capsule, Take 1 capsule (12.5 mg total) by mouth once daily., Disp: 90 capsule, Rfl: 3    lisinopriL (PRINIVIL,ZESTRIL) 40 MG tablet, Take 1 tablet (40 mg total) by mouth once daily., Disp: 90 tablet, Rfl: 3    pantoprazole (PROTONIX) 40 MG tablet, Take 1 tablet (40 mg total) by  "mouth once daily., Disp: 90 tablet, Rfl: 0     ROS:                                                                                                                                                                             Review of Systems   Constitutional: Negative.    HENT: Negative.     Eyes: Negative.    Respiratory: Negative.  Negative for shortness of breath.    Cardiovascular: Negative.  Negative for chest pain, palpitations, orthopnea, claudication, leg swelling and PND.   Gastrointestinal: Negative.    Genitourinary: Negative.    Musculoskeletal: Negative.    Skin: Negative.    Neurological:  Positive for weakness (residual from stroke).   Endo/Heme/Allergies: Negative.    Psychiatric/Behavioral: Negative.          Blood pressure (!) 156/78, pulse 90, temperature 97.7 °F (36.5 °C), resp. rate 18, height 5' 3" (1.6 m), weight 67.2 kg (148 lb 3.2 oz), SpO2 99 %.   PE:  Physical Exam  Constitutional:       Appearance: Normal appearance.   HENT:      Head: Normocephalic.      Mouth/Throat:      Mouth: Mucous membranes are moist.   Eyes:      Extraocular Movements: Extraocular movements intact.   Neck:      Vascular: No carotid bruit.   Cardiovascular:      Rate and Rhythm: Normal rate and regular rhythm.      Pulses: Normal pulses.      Heart sounds: Normal heart sounds. No murmur heard.  Pulmonary:      Effort: Pulmonary effort is normal.      Breath sounds: Normal breath sounds.   Abdominal:      Palpations: Abdomen is soft.   Musculoskeletal:         General: Normal range of motion.      Cervical back: Normal range of motion.      Right lower leg: No edema.      Left lower leg: No edema.   Skin:     General: Skin is warm and dry.   Neurological:      General: No focal deficit present.      Mental Status: He is alert and oriented to person, place, and time.   Psychiatric:         Mood and Affect: Mood normal.          ASSESSMENT/PLAN:  Hx of CVA   - Denies any lightheadedness, dizziness, syncope, or " palpitations  - Has left-sided weakness from prior stroke.  Otherwise no new focal deficits  - Carotid US 8.8.23 with right internal carotid artery 50 to 69% stenosis, left internal carotid artery less than 50% stenosis. Ultimately stable from prior.  - Carotid US Aug. 2021 revealed right internal carotid artery 50 to 69% stenosis, left internal carotid artery less than 50% stenosis.  - Continue Aspirin, Plavix, and Atorvastatin   - Will refer to Vascular Surgery Clinic for maintenance     Hypertension  - BP above goal today  - Patient reports that he has been on this medication for several days now.  Advised patient to  meds on his way home today.  - Will have patient keep home BP log and call in 2 weeks for nurse visit/BP check  - Continue Amlodipine 10 mg daily, Lisinopril 40 mg p.o. daily, HCTZ 2.5 mg p.o. daily   - Counseled on low salt diet and exercise as tolerated    HLD  -Continue Atorvastatin 80mg daily   - CMP/FLP ordered at prior office visit however patient did not complete.  Will reorder labs and have patient complete them prior to his next office visit.  - Counseled on low cholesterol, heart healthy diet and exercise as tolerated    Tobacco Use  - Currently smokes 1/2 PPD and is not interested in quitting  - Strongly encouraged smoking cessation       CMP/FLP  Vascular Surgery Referral   NV in 2 weeks for BP check (call)  Follow up in Cardiology Clinic in 6 months or sooner if needed  Follow up with PCP as directed

## 2023-08-14 NOTE — PATIENT INSTRUCTIONS
CMP/FLP  Vascular Surgery Referral   NV in 2 weeks for BP check (call)  Follow up in Cardiology Clinic in 6 months or sooner if needed  Follow up with PCP as directed

## 2024-06-11 ENCOUNTER — APPOINTMENT (RX ONLY)
Dept: URBAN - METROPOLITAN AREA CLINIC 51 | Facility: CLINIC | Age: 64
Setting detail: DERMATOLOGY
End: 2024-06-11

## 2024-06-11 DIAGNOSIS — Z85.828 PERSONAL HISTORY OF OTHER MALIGNANT NEOPLASM OF SKIN: ICD-10-CM | Status: RESOLVED

## 2024-06-11 DIAGNOSIS — L82.1 OTHER SEBORRHEIC KERATOSIS: ICD-10-CM | Status: STABLE

## 2024-06-11 DIAGNOSIS — L57.0 ACTINIC KERATOSIS: ICD-10-CM | Status: INADEQUATELY CONTROLLED

## 2024-06-11 PROCEDURE — ? COUNSELING

## 2024-06-11 PROCEDURE — ? LIQUID NITROGEN

## 2024-06-11 PROCEDURE — 99212 OFFICE O/P EST SF 10 MIN: CPT | Mod: 25

## 2024-06-11 PROCEDURE — 17003 DESTRUCT PREMALG LES 2-14: CPT

## 2024-06-11 PROCEDURE — ? SUNSCREEN RECOMMENDATIONS

## 2024-06-11 PROCEDURE — 17000 DESTRUCT PREMALG LESION: CPT

## 2024-06-11 ASSESSMENT — LOCATION ZONE DERM
LOCATION ZONE: TRUNK
LOCATION ZONE: EAR
LOCATION ZONE: HAND
LOCATION ZONE: FACE

## 2024-06-11 ASSESSMENT — LOCATION DETAILED DESCRIPTION DERM
LOCATION DETAILED: LEFT SUPERIOR LATERAL MALAR CHEEK
LOCATION DETAILED: RIGHT INFERIOR HELIX
LOCATION DETAILED: EPIGASTRIC SKIN
LOCATION DETAILED: RIGHT ULNAR DORSAL HAND
LOCATION DETAILED: LEFT CENTRAL MALAR CHEEK
LOCATION DETAILED: RIGHT MEDIAL MALAR CHEEK
LOCATION DETAILED: LEFT MEDIAL TEMPLE
LOCATION DETAILED: RIGHT SUPERIOR MEDIAL MALAR CHEEK
LOCATION DETAILED: LEFT SUPERIOR HELIX

## 2024-06-11 ASSESSMENT — LOCATION SIMPLE DESCRIPTION DERM
LOCATION SIMPLE: ABDOMEN
LOCATION SIMPLE: LEFT EAR
LOCATION SIMPLE: RIGHT HAND
LOCATION SIMPLE: RIGHT EAR
LOCATION SIMPLE: LEFT CHEEK
LOCATION SIMPLE: RIGHT CHEEK
LOCATION SIMPLE: LEFT TEMPLE

## 2024-06-11 ASSESSMENT — TOTAL NUMBER OF LESIONS: # OF LESIONS?: 6

## 2024-06-11 ASSESSMENT — PAIN INTENSITY VAS: HOW INTENSE IS YOUR PAIN 0 BEING NO PAIN, 10 BEING THE MOST SEVERE PAIN POSSIBLE?: NO PAIN

## 2024-06-11 NOTE — PROCEDURE: LIQUID NITROGEN
Show Applicator Variable?: Yes
Number Of Freeze-Thaw Cycles: 2 freeze-thaw cycles
Render Note In Bullet Format When Appropriate: No
Consent: The patient's consent was obtained including but not limited to risks of crusting, scabbing, blistering, scarring, darker or lighter pigmentary change, recurrence, incomplete removal and infection.
Post-Care Instructions: I reviewed with the patient in detail post-care instructions. Patient is to wear sunprotection, and avoid picking at any of the treated lesions. Pt may apply Vaseline to crusted or scabbing areas.
Duration Of Freeze Thaw-Cycle (Seconds): 2
Detail Level: Detailed

## 2024-08-22 DIAGNOSIS — E78.5 HYPERLIPIDEMIA LDL GOAL <70: ICD-10-CM

## 2024-08-22 DIAGNOSIS — I10 PRIMARY HYPERTENSION: ICD-10-CM

## 2024-08-22 DIAGNOSIS — Z86.73 HISTORY OF STROKE: ICD-10-CM

## 2024-08-22 RX ORDER — CLOPIDOGREL BISULFATE 75 MG/1
75 TABLET ORAL
Qty: 30 TABLET | Refills: 0 | Status: SHIPPED | OUTPATIENT
Start: 2024-08-22

## 2024-08-22 RX ORDER — ATORVASTATIN CALCIUM 80 MG/1
80 TABLET, FILM COATED ORAL
Qty: 30 TABLET | Refills: 0 | Status: SHIPPED | OUTPATIENT
Start: 2024-08-22

## 2024-08-22 RX ORDER — AMLODIPINE BESYLATE 10 MG/1
10 TABLET ORAL
Qty: 30 TABLET | Refills: 0 | Status: SHIPPED | OUTPATIENT
Start: 2024-08-22

## 2024-08-22 RX ORDER — LISINOPRIL 40 MG/1
40 TABLET ORAL
Qty: 30 TABLET | Refills: 0 | Status: SHIPPED | OUTPATIENT
Start: 2024-08-22

## 2024-09-23 DIAGNOSIS — Z86.73 HISTORY OF STROKE: ICD-10-CM

## 2024-09-23 DIAGNOSIS — I10 PRIMARY HYPERTENSION: ICD-10-CM

## 2024-09-23 DIAGNOSIS — E78.5 HYPERLIPIDEMIA LDL GOAL <70: ICD-10-CM

## 2024-09-23 RX ORDER — ATORVASTATIN CALCIUM 80 MG/1
80 TABLET, FILM COATED ORAL DAILY
Qty: 30 TABLET | Refills: 1 | Status: SHIPPED | OUTPATIENT
Start: 2024-09-23

## 2024-09-23 RX ORDER — AMLODIPINE BESYLATE 10 MG/1
10 TABLET ORAL DAILY
Qty: 30 TABLET | Refills: 1 | Status: SHIPPED | OUTPATIENT
Start: 2024-09-23

## 2024-09-23 RX ORDER — CLOPIDOGREL BISULFATE 75 MG/1
75 TABLET ORAL DAILY
Qty: 30 TABLET | Refills: 1 | Status: SHIPPED | OUTPATIENT
Start: 2024-09-23

## 2024-09-26 DIAGNOSIS — I10 PRIMARY HYPERTENSION: ICD-10-CM

## 2024-09-26 RX ORDER — LISINOPRIL 40 MG/1
40 TABLET ORAL
Qty: 30 TABLET | Refills: 0 | Status: SHIPPED | OUTPATIENT
Start: 2024-09-26

## 2024-09-30 ENCOUNTER — TELEPHONE (OUTPATIENT)
Dept: CARDIOLOGY | Facility: CLINIC | Age: 64
End: 2024-09-30
Payer: MEDICAID

## 2024-09-30 DIAGNOSIS — I10 PRIMARY HYPERTENSION: ICD-10-CM

## 2024-09-30 RX ORDER — LISINOPRIL 40 MG/1
40 TABLET ORAL DAILY
Qty: 30 TABLET | Refills: 1 | Status: SHIPPED | OUTPATIENT
Start: 2024-09-30 | End: 2024-11-29

## 2024-09-30 NOTE — TELEPHONE ENCOUNTER
Lt vm asking pt to call back with regards to rescheduling F/U apt from 01/2024 per in basket note from Eunice and provider. Apt has been rescheduled from cancelled apt on 01/2024 to  10/14/24 @ 2pm. Will also mail apt letter.

## 2025-05-08 DIAGNOSIS — E78.5 HYPERLIPIDEMIA LDL GOAL <70: ICD-10-CM

## 2025-05-08 DIAGNOSIS — I10 PRIMARY HYPERTENSION: ICD-10-CM

## 2025-05-08 DIAGNOSIS — Z86.73 HISTORY OF STROKE: ICD-10-CM

## 2025-05-08 RX ORDER — AMLODIPINE BESYLATE 10 MG/1
10 TABLET ORAL
Qty: 30 TABLET | Refills: 1 | Status: SHIPPED | OUTPATIENT
Start: 2025-05-08

## 2025-05-08 RX ORDER — CLOPIDOGREL BISULFATE 75 MG/1
75 TABLET ORAL
Qty: 30 TABLET | Refills: 1 | Status: SHIPPED | OUTPATIENT
Start: 2025-05-08

## 2025-05-08 RX ORDER — ATORVASTATIN CALCIUM 80 MG/1
80 TABLET, FILM COATED ORAL DAILY
Qty: 30 TABLET | Refills: 1 | Status: SHIPPED | OUTPATIENT
Start: 2025-05-08

## 2025-05-13 ENCOUNTER — APPOINTMENT (OUTPATIENT)
Dept: URBAN - METROPOLITAN AREA CLINIC 51 | Facility: CLINIC | Age: 65
Setting detail: DERMATOLOGY
End: 2025-05-13

## 2025-05-13 DIAGNOSIS — L57.0 ACTINIC KERATOSIS: ICD-10-CM | Status: INADEQUATELY CONTROLLED

## 2025-05-13 DIAGNOSIS — L82.1 OTHER SEBORRHEIC KERATOSIS: ICD-10-CM | Status: INADEQUATELY CONTROLLED

## 2025-05-13 PROCEDURE — 17110 DESTRUCTION B9 LES UP TO 14: CPT

## 2025-05-13 PROCEDURE — ? LIQUID NITROGEN

## 2025-05-13 PROCEDURE — 17000 DESTRUCT PREMALG LESION: CPT | Mod: 59

## 2025-05-13 PROCEDURE — 17003 DESTRUCT PREMALG LES 2-14: CPT | Mod: 59

## 2025-05-13 PROCEDURE — ? COUNSELING

## 2025-05-13 ASSESSMENT — LOCATION ZONE DERM
LOCATION ZONE: FACE
LOCATION ZONE: EAR
LOCATION ZONE: SCALP

## 2025-05-13 ASSESSMENT — LOCATION SIMPLE DESCRIPTION DERM
LOCATION SIMPLE: RIGHT ZYGOMA
LOCATION SIMPLE: LEFT EAR
LOCATION SIMPLE: LEFT TEMPLE
LOCATION SIMPLE: SCALP
LOCATION SIMPLE: LEFT FOREHEAD
LOCATION SIMPLE: LEFT ZYGOMA
LOCATION SIMPLE: RIGHT TEMPLE

## 2025-05-13 ASSESSMENT — LOCATION DETAILED DESCRIPTION DERM
LOCATION DETAILED: RIGHT SUPERIOR POSTAURICULAR SKIN
LOCATION DETAILED: LEFT TRIANGULAR FOSSA
LOCATION DETAILED: RIGHT CENTRAL TEMPLE
LOCATION DETAILED: LEFT MEDIAL ZYGOMA
LOCATION DETAILED: LEFT INFERIOR FOREHEAD
LOCATION DETAILED: RIGHT CENTRAL ZYGOMA
LOCATION DETAILED: LEFT CENTRAL TEMPLE

## 2025-05-13 ASSESSMENT — TOTAL NUMBER OF LESIONS: # OF LESIONS?: 8

## 2025-05-13 ASSESSMENT — PAIN INTENSITY VAS: HOW INTENSE IS YOUR PAIN 0 BEING NO PAIN, 10 BEING THE MOST SEVERE PAIN POSSIBLE?: NO PAIN

## 2025-05-13 NOTE — PROCEDURE: LIQUID NITROGEN
None
Render Post-Care Instructions In Note?: no
Post-Care Instructions: I reviewed with the patient in detail post-care instructions. Patient is to wear sunprotection, and avoid picking at any of the treated lesions. Pt may apply Vaseline to crusted or scabbing areas.
Bill Insurance (You Assume Risk Of Denial Or Audit By Selecting Yes): Yes
Duration Of Freeze Thaw-Cycle (Seconds): 0
Medical Necessity Information: It is in your best interest to select a reason for this procedure from the list below. All of these items fulfill various CMS LCD requirements except the new and changing color options.
Spray Paint Text: The liquid nitrogen was applied to the skin utilizing a spray paint frosting technique.
Consent: The patient's consent was obtained including but not limited to risks of crusting, scabbing, blistering, scarring, darker or lighter pigmentary change, recurrence, incomplete removal and infection.
Medical Necessity Clause: This procedure was medically necessary because the lesions that were treated were:
Detail Level: Detailed
Number Of Freeze-Thaw Cycles: 2 freeze-thaw cycles
Duration Of Freeze Thaw-Cycle (Seconds): 2

## 2025-07-17 ENCOUNTER — APPOINTMENT (OUTPATIENT)
Dept: URBAN - METROPOLITAN AREA CLINIC 179 | Facility: CLINIC | Age: 65
Setting detail: DERMATOLOGY
End: 2025-07-17

## 2025-07-17 DIAGNOSIS — L82.1 OTHER SEBORRHEIC KERATOSIS: ICD-10-CM | Status: INADEQUATELY CONTROLLED

## 2025-07-17 PROCEDURE — ? COUNSELING

## 2025-07-17 PROCEDURE — ? LIQUID NITROGEN

## 2025-07-17 ASSESSMENT — LOCATION ZONE DERM
LOCATION ZONE: LEG
LOCATION ZONE: FACE
LOCATION ZONE: HAND

## 2025-07-17 ASSESSMENT — LOCATION DETAILED DESCRIPTION DERM
LOCATION DETAILED: LEFT INFERIOR LATERAL FOREHEAD
LOCATION DETAILED: RIGHT RADIAL DORSAL HAND
LOCATION DETAILED: LEFT LATERAL EYEBROW
LOCATION DETAILED: LEFT ANTERIOR DISTAL THIGH

## 2025-07-17 ASSESSMENT — LOCATION SIMPLE DESCRIPTION DERM
LOCATION SIMPLE: LEFT FOREHEAD
LOCATION SIMPLE: LEFT EYEBROW
LOCATION SIMPLE: LEFT THIGH
LOCATION SIMPLE: RIGHT HAND

## 2025-07-17 ASSESSMENT — PAIN INTENSITY VAS: HOW INTENSE IS YOUR PAIN 0 BEING NO PAIN, 10 BEING THE MOST SEVERE PAIN POSSIBLE?: NO PAIN

## 2025-07-17 NOTE — PROCEDURE: LIQUID NITROGEN
Render Note In Bullet Format When Appropriate: No
Render Post-Care Instructions In Note?: yes
Medical Necessity Clause: This procedure was medically necessary because the lesions that were treated were:
Medical Necessity Information: It is in your best interest to select a reason for this procedure from the list below. All of these items fulfill various CMS LCD requirements except the new and changing color options.
Post-Care Instructions: I reviewed with the patient in detail post-care instructions. Patient is to wear sunprotection, and avoid picking at any of the treated lesions. Pt may apply Vaseline to crusted or scabbing areas.
Spray Paint Text: The liquid nitrogen was applied to the skin utilizing a spray paint frosting technique.
Consent: The patient's consent was obtained including but not limited to risks of crusting, scabbing, blistering, scarring, darker or lighter pigmentary change, recurrence, incomplete removal and infection.
Detail Level: Detailed